# Patient Record
Sex: MALE | Race: WHITE | Employment: OTHER | URBAN - METROPOLITAN AREA
[De-identification: names, ages, dates, MRNs, and addresses within clinical notes are randomized per-mention and may not be internally consistent; named-entity substitution may affect disease eponyms.]

---

## 2017-01-10 PROBLEM — M16.12 OSTEOARTHRITIS OF LEFT HIP: Chronic | Status: ACTIVE | Noted: 2017-01-10

## 2017-01-11 ENCOUNTER — ANESTHESIA EVENT (OUTPATIENT)
Dept: SURGERY | Age: 82
DRG: 470 | End: 2017-01-11
Payer: MEDICARE

## 2017-01-12 ENCOUNTER — ANESTHESIA (OUTPATIENT)
Dept: SURGERY | Age: 82
DRG: 470 | End: 2017-01-12
Payer: MEDICARE

## 2017-01-12 ENCOUNTER — APPOINTMENT (OUTPATIENT)
Dept: GENERAL RADIOLOGY | Age: 82
DRG: 470 | End: 2017-01-12
Attending: ORTHOPAEDIC SURGERY
Payer: MEDICARE

## 2017-01-12 ENCOUNTER — SURGERY (OUTPATIENT)
Age: 82
End: 2017-01-12

## 2017-01-12 ENCOUNTER — APPOINTMENT (OUTPATIENT)
Dept: GENERAL RADIOLOGY | Age: 82
DRG: 470 | End: 2017-01-12
Attending: PHYSICIAN ASSISTANT
Payer: MEDICARE

## 2017-01-12 PROCEDURE — 74011000250 HC RX REV CODE- 250: Performed by: PHYSICIAN ASSISTANT

## 2017-01-12 PROCEDURE — 77030012508 HC MSK AIRWY LMA AMBU -A: Performed by: SPECIALIST

## 2017-01-12 PROCEDURE — 64447 NJX AA&/STRD FEMORAL NRV IMG: CPT | Performed by: SPECIALIST

## 2017-01-12 PROCEDURE — 74011000250 HC RX REV CODE- 250

## 2017-01-12 PROCEDURE — 77030016060 HC NDL NRV BLK TELE -A: Performed by: SPECIALIST

## 2017-01-12 PROCEDURE — 74011250636 HC RX REV CODE- 250/636: Performed by: ORTHOPAEDIC SURGERY

## 2017-01-12 PROCEDURE — 73501 X-RAY EXAM HIP UNI 1 VIEW: CPT

## 2017-01-12 PROCEDURE — 74011000258 HC RX REV CODE- 258: Performed by: PHYSICIAN ASSISTANT

## 2017-01-12 PROCEDURE — 74011250636 HC RX REV CODE- 250/636

## 2017-01-12 PROCEDURE — 74011250636 HC RX REV CODE- 250/636: Performed by: SPECIALIST

## 2017-01-12 PROCEDURE — 74011000258 HC RX REV CODE- 258

## 2017-01-12 RX ORDER — PROPOFOL 10 MG/ML
INJECTION, EMULSION INTRAVENOUS AS NEEDED
Status: DISCONTINUED | OUTPATIENT
Start: 2017-01-12 | End: 2017-01-12 | Stop reason: HOSPADM

## 2017-01-12 RX ORDER — HYDROMORPHONE HYDROCHLORIDE 2 MG/ML
INJECTION, SOLUTION INTRAMUSCULAR; INTRAVENOUS; SUBCUTANEOUS AS NEEDED
Status: DISCONTINUED | OUTPATIENT
Start: 2017-01-12 | End: 2017-01-12 | Stop reason: HOSPADM

## 2017-01-12 RX ORDER — EPHEDRINE SULFATE/0.9% NACL/PF 25 MG/5 ML
SYRINGE (ML) INTRAVENOUS AS NEEDED
Status: DISCONTINUED | OUTPATIENT
Start: 2017-01-12 | End: 2017-01-12 | Stop reason: HOSPADM

## 2017-01-12 RX ORDER — FENTANYL CITRATE 50 UG/ML
INJECTION, SOLUTION INTRAMUSCULAR; INTRAVENOUS AS NEEDED
Status: DISCONTINUED | OUTPATIENT
Start: 2017-01-12 | End: 2017-01-12 | Stop reason: HOSPADM

## 2017-01-12 RX ORDER — LIDOCAINE HYDROCHLORIDE 20 MG/ML
INJECTION, SOLUTION EPIDURAL; INFILTRATION; INTRACAUDAL; PERINEURAL AS NEEDED
Status: DISCONTINUED | OUTPATIENT
Start: 2017-01-12 | End: 2017-01-12 | Stop reason: HOSPADM

## 2017-01-12 RX ADMIN — ROPIVACAINE HYDROCHLORIDE 42 ML: 10 INJECTION, SOLUTION EPIDURAL at 13:21

## 2017-01-12 RX ADMIN — Medication 10 MG: at 13:08

## 2017-01-12 RX ADMIN — SODIUM CHLORIDE 1 G: 900 INJECTION, SOLUTION INTRAVENOUS at 13:12

## 2017-01-12 RX ADMIN — ACETAMINOPHEN 1000 MG: 10 INJECTION, SOLUTION INTRAVENOUS at 12:52

## 2017-01-12 RX ADMIN — CEFAZOLIN 1000 ML: 1 INJECTION, POWDER, FOR SOLUTION INTRAMUSCULAR; INTRAVENOUS; PARENTERAL at 13:21

## 2017-01-12 RX ADMIN — CEFAZOLIN SODIUM 2 G: 2 SOLUTION INTRAVENOUS at 12:55

## 2017-01-12 RX ADMIN — PROPOFOL 100 MG: 10 INJECTION, EMULSION INTRAVENOUS at 12:59

## 2017-01-12 RX ADMIN — SODIUM CHLORIDE 1 G: 900 INJECTION, SOLUTION INTRAVENOUS at 13:53

## 2017-01-12 RX ADMIN — FENTANYL CITRATE 50 MCG: 50 INJECTION, SOLUTION INTRAMUSCULAR; INTRAVENOUS at 13:45

## 2017-01-12 RX ADMIN — FENTANYL CITRATE 100 MCG: 50 INJECTION, SOLUTION INTRAMUSCULAR; INTRAVENOUS at 12:59

## 2017-01-12 RX ADMIN — LIDOCAINE HYDROCHLORIDE 60 MG: 20 INJECTION, SOLUTION EPIDURAL; INFILTRATION; INTRACAUDAL; PERINEURAL at 12:59

## 2017-01-12 RX ADMIN — HYDROMORPHONE HYDROCHLORIDE 1 MG: 2 INJECTION, SOLUTION INTRAMUSCULAR; INTRAVENOUS; SUBCUTANEOUS at 12:59

## 2017-01-12 RX ADMIN — FENTANYL CITRATE 50 MCG: 50 INJECTION, SOLUTION INTRAMUSCULAR; INTRAVENOUS at 13:37

## 2017-01-12 RX ADMIN — FENTANYL CITRATE 50 MCG: 50 INJECTION, SOLUTION INTRAMUSCULAR; INTRAVENOUS at 13:30

## 2017-01-12 RX ADMIN — SODIUM CHLORIDE, SODIUM LACTATE, POTASSIUM CHLORIDE, AND CALCIUM CHLORIDE: 600; 310; 30; 20 INJECTION, SOLUTION INTRAVENOUS at 13:22

## 2017-01-12 NOTE — ANESTHESIA PREPROCEDURE EVALUATION
Anesthetic History   No history of anesthetic complications     Pertinent negatives: No PONV       Review of Systems / Medical History  Patient summary reviewed, nursing notes reviewed and pertinent labs reviewed    Pulmonary    COPD: mild          Pertinent negatives: No smoker     Neuro/Psych   Within defined limits           Cardiovascular    Hypertension: well controlled            Pertinent negatives: No past MI and CAD       GI/Hepatic/Renal  Within defined limits           Pertinent negatives: No GERD, liver disease and renal disease   Endo/Other        Arthritis  Pertinent negatives: No diabetes   Other Findings   Comments: -etoh           Physical Exam    Airway  Mallampati: II  TM Distance: 4 - 6 cm  Neck ROM: decreased range of motion   Mouth opening: Normal     Cardiovascular  Regular rate and rhythm,  S1 and S2 normal,  no murmur, click, rub, or gallop             Dental    Dentition: Lower partial plate and Caps/crowns     Pulmonary  Breath sounds clear to auscultation               Abdominal         Other Findings            Anesthetic Plan    ASA: 2  Anesthesia type: general          Induction: Intravenous  Anesthetic plan and risks discussed with: Patient

## 2017-01-12 NOTE — ANESTHESIA POSTPROCEDURE EVALUATION
Post-Anesthesia Evaluation and Assessment    Cardiovascular Function/Vital Signs  Visit Vitals    /59    Pulse 62    Temp 37.6 °C (99.7 °F)    Resp 12    Ht 5' 3\" (1.6 m)    Wt 59.6 kg (131 lb 5 oz)    SpO2 100%    BMI 23.26 kg/m2       Patient is status post Procedure(s):  LEFT TOTAL HIP REPLACEMENT ANTERIOR APPROACH W/C-ARM. Nausea/Vomiting: Controlled. Postoperative hydration reviewed and adequate. Pain:  Pain Scale 1: Numeric (0 - 10) (01/12/17 1500)  Pain Intensity 1: 0 (01/12/17 1500)   Managed. Neurological Status:   Neuro (WDL): Within Defined Limits (01/12/17 1500)   At baseline. Mental Status and Level of Consciousness: Baseline and stable. Pulmonary Status:   O2 Device: Nasal cannula (01/12/17 1455)   Adequate oxygenation and airway patent. Complications related to anesthesia: None    Post-anesthesia assessment completed. No concerns. Patient has met all discharge requirements.     Signed By: Karla Miramontes MD

## 2017-01-13 ENCOUNTER — HOME HEALTH ADMISSION (OUTPATIENT)
Dept: HOME HEALTH SERVICES | Facility: HOME HEALTH | Age: 82
End: 2017-01-13
Payer: MEDICARE

## 2017-01-14 ENCOUNTER — HOME CARE VISIT (OUTPATIENT)
Dept: SCHEDULING | Facility: HOME HEALTH | Age: 82
End: 2017-01-14
Payer: MEDICARE

## 2017-01-14 VITALS
HEIGHT: 63 IN | RESPIRATION RATE: 16 BRPM | BODY MASS INDEX: 23.28 KG/M2 | TEMPERATURE: 97.7 F | DIASTOLIC BLOOD PRESSURE: 50 MMHG | OXYGEN SATURATION: 93 % | SYSTOLIC BLOOD PRESSURE: 102 MMHG | HEART RATE: 87 BPM | WEIGHT: 131.39 LBS

## 2017-01-14 PROCEDURE — 3331090002 HH PPS REVENUE DEBIT

## 2017-01-14 PROCEDURE — 400013 HH SOC

## 2017-01-14 PROCEDURE — 3331090001 HH PPS REVENUE CREDIT

## 2017-01-14 PROCEDURE — G0299 HHS/HOSPICE OF RN EA 15 MIN: HCPCS

## 2017-01-15 ENCOUNTER — HOME CARE VISIT (OUTPATIENT)
Dept: SCHEDULING | Facility: HOME HEALTH | Age: 82
End: 2017-01-15
Payer: MEDICARE

## 2017-01-15 VITALS — RESPIRATION RATE: 17 BRPM | SYSTOLIC BLOOD PRESSURE: 110 MMHG | DIASTOLIC BLOOD PRESSURE: 70 MMHG | HEART RATE: 76 BPM

## 2017-01-15 PROCEDURE — 3331090001 HH PPS REVENUE CREDIT

## 2017-01-15 PROCEDURE — G0151 HHCP-SERV OF PT,EA 15 MIN: HCPCS

## 2017-01-15 PROCEDURE — 3331090002 HH PPS REVENUE DEBIT

## 2017-01-16 ENCOUNTER — HOME CARE VISIT (OUTPATIENT)
Dept: SCHEDULING | Facility: HOME HEALTH | Age: 82
End: 2017-01-16
Payer: MEDICARE

## 2017-01-16 PROCEDURE — G0157 HHC PT ASSISTANT EA 15: HCPCS

## 2017-01-16 PROCEDURE — 3331090002 HH PPS REVENUE DEBIT

## 2017-01-16 PROCEDURE — 3331090001 HH PPS REVENUE CREDIT

## 2017-01-17 ENCOUNTER — HOME CARE VISIT (OUTPATIENT)
Dept: SCHEDULING | Facility: HOME HEALTH | Age: 82
End: 2017-01-17
Payer: MEDICARE

## 2017-01-17 VITALS
HEART RATE: 80 BPM | TEMPERATURE: 98.4 F | RESPIRATION RATE: 20 BRPM | OXYGEN SATURATION: 94 % | SYSTOLIC BLOOD PRESSURE: 130 MMHG | DIASTOLIC BLOOD PRESSURE: 66 MMHG

## 2017-01-17 PROCEDURE — 3331090001 HH PPS REVENUE CREDIT

## 2017-01-17 PROCEDURE — G0299 HHS/HOSPICE OF RN EA 15 MIN: HCPCS

## 2017-01-17 PROCEDURE — 3331090002 HH PPS REVENUE DEBIT

## 2017-01-18 ENCOUNTER — HOME CARE VISIT (OUTPATIENT)
Dept: SCHEDULING | Facility: HOME HEALTH | Age: 82
End: 2017-01-18
Payer: MEDICARE

## 2017-01-18 PROCEDURE — 3331090001 HH PPS REVENUE CREDIT

## 2017-01-18 PROCEDURE — G0157 HHC PT ASSISTANT EA 15: HCPCS

## 2017-01-18 PROCEDURE — 3331090002 HH PPS REVENUE DEBIT

## 2017-01-19 PROCEDURE — 3331090002 HH PPS REVENUE DEBIT

## 2017-01-19 PROCEDURE — 3331090001 HH PPS REVENUE CREDIT

## 2017-01-20 ENCOUNTER — HOME CARE VISIT (OUTPATIENT)
Dept: SCHEDULING | Facility: HOME HEALTH | Age: 82
End: 2017-01-20
Payer: MEDICARE

## 2017-01-20 PROCEDURE — G0299 HHS/HOSPICE OF RN EA 15 MIN: HCPCS

## 2017-01-20 PROCEDURE — 3331090001 HH PPS REVENUE CREDIT

## 2017-01-20 PROCEDURE — G0157 HHC PT ASSISTANT EA 15: HCPCS

## 2017-01-20 PROCEDURE — 3331090002 HH PPS REVENUE DEBIT

## 2017-01-21 PROCEDURE — 3331090001 HH PPS REVENUE CREDIT

## 2017-01-21 PROCEDURE — 3331090002 HH PPS REVENUE DEBIT

## 2017-01-22 VITALS
DIASTOLIC BLOOD PRESSURE: 60 MMHG | TEMPERATURE: 98.1 F | SYSTOLIC BLOOD PRESSURE: 119 MMHG | RESPIRATION RATE: 20 BRPM | HEART RATE: 80 BPM | OXYGEN SATURATION: 95 %

## 2017-01-22 PROCEDURE — 3331090002 HH PPS REVENUE DEBIT

## 2017-01-22 PROCEDURE — 3331090001 HH PPS REVENUE CREDIT

## 2017-01-23 ENCOUNTER — HOME CARE VISIT (OUTPATIENT)
Dept: SCHEDULING | Facility: HOME HEALTH | Age: 82
End: 2017-01-23
Payer: MEDICARE

## 2017-01-23 PROCEDURE — 3331090001 HH PPS REVENUE CREDIT

## 2017-01-23 PROCEDURE — 3331090002 HH PPS REVENUE DEBIT

## 2017-01-23 PROCEDURE — G0157 HHC PT ASSISTANT EA 15: HCPCS

## 2017-01-24 PROCEDURE — 3331090002 HH PPS REVENUE DEBIT

## 2017-01-24 PROCEDURE — 3331090001 HH PPS REVENUE CREDIT

## 2017-01-25 ENCOUNTER — HOME CARE VISIT (OUTPATIENT)
Dept: SCHEDULING | Facility: HOME HEALTH | Age: 82
End: 2017-01-25
Payer: MEDICARE

## 2017-01-25 PROCEDURE — G0157 HHC PT ASSISTANT EA 15: HCPCS

## 2017-01-25 PROCEDURE — 3331090002 HH PPS REVENUE DEBIT

## 2017-01-25 PROCEDURE — 3331090001 HH PPS REVENUE CREDIT

## 2017-01-26 ENCOUNTER — HOME CARE VISIT (OUTPATIENT)
Dept: SCHEDULING | Facility: HOME HEALTH | Age: 82
End: 2017-01-26
Payer: MEDICARE

## 2017-01-26 PROCEDURE — G0157 HHC PT ASSISTANT EA 15: HCPCS

## 2017-01-26 PROCEDURE — 3331090001 HH PPS REVENUE CREDIT

## 2017-01-26 PROCEDURE — 3331090002 HH PPS REVENUE DEBIT

## 2017-01-27 PROCEDURE — 3331090002 HH PPS REVENUE DEBIT

## 2017-01-27 PROCEDURE — 3331090001 HH PPS REVENUE CREDIT

## 2017-01-28 PROCEDURE — 3331090002 HH PPS REVENUE DEBIT

## 2017-01-28 PROCEDURE — 3331090001 HH PPS REVENUE CREDIT

## 2017-01-29 PROCEDURE — 3331090001 HH PPS REVENUE CREDIT

## 2017-01-29 PROCEDURE — 3331090002 HH PPS REVENUE DEBIT

## 2017-01-30 ENCOUNTER — HOME CARE VISIT (OUTPATIENT)
Dept: SCHEDULING | Facility: HOME HEALTH | Age: 82
End: 2017-01-30
Payer: MEDICARE

## 2017-01-30 PROCEDURE — 3331090002 HH PPS REVENUE DEBIT

## 2017-01-30 PROCEDURE — G0157 HHC PT ASSISTANT EA 15: HCPCS

## 2017-01-30 PROCEDURE — 3331090001 HH PPS REVENUE CREDIT

## 2017-01-31 ENCOUNTER — HOME CARE VISIT (OUTPATIENT)
Dept: SCHEDULING | Facility: HOME HEALTH | Age: 82
End: 2017-01-31
Payer: MEDICARE

## 2017-01-31 PROCEDURE — 3331090003 HH PPS REVENUE ADJ

## 2017-01-31 PROCEDURE — G0151 HHCP-SERV OF PT,EA 15 MIN: HCPCS

## 2017-01-31 PROCEDURE — 3331090002 HH PPS REVENUE DEBIT

## 2017-01-31 PROCEDURE — 3331090001 HH PPS REVENUE CREDIT
